# Patient Record
Sex: MALE | Race: WHITE | NOT HISPANIC OR LATINO | Employment: FULL TIME | URBAN - METROPOLITAN AREA
[De-identification: names, ages, dates, MRNs, and addresses within clinical notes are randomized per-mention and may not be internally consistent; named-entity substitution may affect disease eponyms.]

---

## 2022-01-31 ENCOUNTER — OFFICE VISIT (OUTPATIENT)
Dept: URGENT CARE | Facility: URGENT CARE | Age: 32
End: 2022-01-31
Payer: COMMERCIAL

## 2022-01-31 VITALS
HEIGHT: 73 IN | TEMPERATURE: 97.3 F | BODY MASS INDEX: 27.3 KG/M2 | RESPIRATION RATE: 16 BRPM | WEIGHT: 206 LBS | SYSTOLIC BLOOD PRESSURE: 107 MMHG | HEART RATE: 78 BPM | OXYGEN SATURATION: 95 % | DIASTOLIC BLOOD PRESSURE: 67 MMHG

## 2022-01-31 DIAGNOSIS — N45.1 EPIDIDYMITIS: Primary | ICD-10-CM

## 2022-01-31 PROCEDURE — 99203 OFFICE O/P NEW LOW 30 MIN: CPT | Performed by: PHYSICIAN ASSISTANT

## 2022-01-31 RX ORDER — CELECOXIB 100 MG/1
100 CAPSULE ORAL 2 TIMES DAILY
Qty: 14 CAPSULE | Refills: 0 | Status: SHIPPED | OUTPATIENT
Start: 2022-01-31 | End: 2022-02-10

## 2022-01-31 RX ORDER — NAPROXEN 500 MG/1
500 TABLET ORAL
Qty: 21 TABLET | Refills: 0 | Status: SHIPPED | OUTPATIENT
Start: 2022-01-31

## 2022-01-31 ASSESSMENT — MIFFLIN-ST. JEOR: SCORE: 1953.29

## 2022-01-31 NOTE — PATIENT INSTRUCTIONS
Patient Education     Epididymitis   Inflammation of the epididymis can cause pain and swelling in your scrotum. The epididymis is a small tube next to the testicle that stores sperm. Epididymitis is often caused by an infection. In sexually active men, it is often caused by a sexually transmitted infection (STI) such as chlamydia or gonorrhea. In boys and in men over 40, it can be from bacteria from other parts of the urinary tract (not an STI infection).  Symptoms may begin with pain in the lower belly (abdomen) or low back. The pain then spreads down into the scrotum. Often only one side is affected. The testicle and scrotum swell and become very painful and red. You may have fever and a burning when passing urine. Sometimes you may have a discharge from the penis.  Treatment is with antibiotics, and anti-inflammatory and pain medicines. The condition should get better over the first few days of treatment. But it will take several weeks for all the swelling and mild pain to go away. If your healthcare provider thinks that an STI is the cause, your sexual partners may need to be treated.  Home care  Here are some tips to help you care for yourself at home:    Support the scrotum. When lying down, place a rolled towel under the scrotum. When walking, use an athletic supporter or 2 pairs of jockey-style underwear.    To ease pain, put ice packs on the inflamed area. To make an ice pack, put ice cubes in a plastic bag that seals at the top. Wrap the bag in a clean, thin towel. Never put an ice pack directly on the skin.    Take pain medicine as directed. You may use over-the-counter medicines to control pain, unless another medicine was given. If you have long-term (chronic) liver or kidney disease, talk with your healthcare provider before taking these medicines. Also talk with your provider if you've ever had a stomach ulcer or GI (gastrointestinal) bleeding.    Get some rest.  Rest in bed for the first few days  until the fever, pain, and swelling get better. It may take several weeks for all of the swelling to go away.    Prevent constipation. Constipation can make you strain. This makes the pain worse. Prevent constipation by eating natural laxatives. These include prunes, fresh fruits, and whole-grain cereals. If needed, use a mild over-the-counter laxative for constipation. Mineral oil can be used to keep the stools soft.    Wait to have sex. Don't have sex until you have finished all treatment and all symptoms have cleared.    Take all medicine as directed. Don't miss any doses. And don't stop taking your medicine early, even if you feel better.  Follow-up care  Follow up with your healthcare provider, or as advised, to be sure you are responding correctly to treatment. If a culture was taken, you may call for the result as directed. A culture test can ensure that you are on the correct antibiotic.   When to seek medical advice  Call your healthcare provider right away if any of these occur:    Fever of 100.4 F (38 C) or higher, or as directed by your provider    More pain or swelling of the testicle after starting treatment    Pressure or pain in your bladder that gets worse    Unable to pass urine for 8 hours  Candice last reviewed this educational content on 9/1/2019 2000-2021 [employer]. All rights reserved. This information is not intended as a substitute for professional medical care. Always follow your healthcare professional's instructions.

## 2022-01-31 NOTE — PROGRESS NOTES
Epididymitis  - celecoxib (CELEBREX) 100 MG capsule; Take 1 capsule (100 mg) by mouth 2 times daily for 10 days  - naproxen (NAPROSYN) 500 MG tablet; Take 1 tablet (500 mg) by mouth 3 times daily (with meals)     See Patient Instructions  Patient Instructions     Patient Education     Epididymitis   Inflammation of the epididymis can cause pain and swelling in your scrotum. The epididymis is a small tube next to the testicle that stores sperm. Epididymitis is often caused by an infection. In sexually active men, it is often caused by a sexually transmitted infection (STI) such as chlamydia or gonorrhea. In boys and in men over 40, it can be from bacteria from other parts of the urinary tract (not an STI infection).  Symptoms may begin with pain in the lower belly (abdomen) or low back. The pain then spreads down into the scrotum. Often only one side is affected. The testicle and scrotum swell and become very painful and red. You may have fever and a burning when passing urine. Sometimes you may have a discharge from the penis.  Treatment is with antibiotics, and anti-inflammatory and pain medicines. The condition should get better over the first few days of treatment. But it will take several weeks for all the swelling and mild pain to go away. If your healthcare provider thinks that an STI is the cause, your sexual partners may need to be treated.  Home care  Here are some tips to help you care for yourself at home:    Support the scrotum. When lying down, place a rolled towel under the scrotum. When walking, use an athletic supporter or 2 pairs of jockey-style underwear.    To ease pain, put ice packs on the inflamed area. To make an ice pack, put ice cubes in a plastic bag that seals at the top. Wrap the bag in a clean, thin towel. Never put an ice pack directly on the skin.    Take pain medicine as directed. You may use over-the-counter medicines to control pain, unless another medicine was given. If you have  long-term (chronic) liver or kidney disease, talk with your healthcare provider before taking these medicines. Also talk with your provider if you've ever had a stomach ulcer or GI (gastrointestinal) bleeding.    Get some rest.  Rest in bed for the first few days until the fever, pain, and swelling get better. It may take several weeks for all of the swelling to go away.    Prevent constipation. Constipation can make you strain. This makes the pain worse. Prevent constipation by eating natural laxatives. These include prunes, fresh fruits, and whole-grain cereals. If needed, use a mild over-the-counter laxative for constipation. Mineral oil can be used to keep the stools soft.    Wait to have sex. Don't have sex until you have finished all treatment and all symptoms have cleared.    Take all medicine as directed. Don't miss any doses. And don't stop taking your medicine early, even if you feel better.  Follow-up care  Follow up with your healthcare provider, or as advised, to be sure you are responding correctly to treatment. If a culture was taken, you may call for the result as directed. A culture test can ensure that you are on the correct antibiotic.   When to seek medical advice  Call your healthcare provider right away if any of these occur:    Fever of 100.4 F (38 C) or higher, or as directed by your provider    More pain or swelling of the testicle after starting treatment    Pressure or pain in your bladder that gets worse    Unable to pass urine for 8 hours  Candice last reviewed this educational content on 9/1/2019 2000-2021 [employer]. All rights reserved. This information is not intended as a substitute for professional medical care. Always follow your healthcare professional's instructions.               Ifeanyi Melgar PA-C  [facility]    Subjective   31 year old who presents to clinic today for the following health issues:    Urgent Care and Testicular/scrotal  Pain       HPI     Genitourinary - Male  Onset/Duration: L testical pain on x 10 hours ago. Occurred in the middle of the night without injury. 6/10 in severity. No lumps or masses  Description:   Dysuria (painful urination): no}  Hematuria (blood in urine): no  Frequency: no  Waking at night to urinate: no   Hesitancy (delay in urine): no  Retention (unable to empty): no  Decrease in urinary flow: no  Incontinence: no  Progression of Symptoms:  same  Accompanying Signs & Symptoms:  Fever: no  Back/Flank pain: no  Urethral discharge: no  Testicle lumps/masses/pain: no  Nausea and/or vomiting: no  Abdominal pain: no  History:   History of frequent UTI s: no  History of kidney stones: no  History of hernias: no  Personal or Family history of Prostate problems: no  Sexually active: YES- One partner  Precipitating or alleviating factors: None  Therapies tried and outcome: none    Patient states that he has a history of inguinal hernia and hydrocele (this was removed).      Review of Systems   Review of Systems   See HPI     Objective    Temp: 97.3  F (36.3  C) Temp src: Temporal BP: 107/67 Pulse: 78   Resp: 16 SpO2: 95 %       Physical Exam   Physical Exam  Constitutional:       General: He is not in acute distress.     Appearance: Normal appearance. He is normal weight. He is not ill-appearing, toxic-appearing or diaphoretic.   HENT:      Head: Normocephalic and atraumatic.   Cardiovascular:      Rate and Rhythm: Normal rate.      Pulses: Normal pulses.   Pulmonary:      Effort: Pulmonary effort is normal. No respiratory distress.   Genitourinary:     Testes:         Right: Mass, tenderness, swelling, testicular hydrocele or varicocele not present. Right testis is descended.         Left: Mass, tenderness, swelling, testicular hydrocele or varicocele not present. Left testis is descended.      Epididymis:      Left: Inflamed. Tenderness present.   Neurological:      General: No focal deficit present.      Mental  Status: He is alert and oriented to person, place, and time. Mental status is at baseline.      Gait: Gait normal.   Psychiatric:         Mood and Affect: Mood normal.         Behavior: Behavior normal.         Thought Content: Thought content normal.         Judgment: Judgment normal.          No results found for this or any previous visit (from the past 24 hour(s)).